# Patient Record
Sex: MALE | Race: WHITE | URBAN - METROPOLITAN AREA
[De-identification: names, ages, dates, MRNs, and addresses within clinical notes are randomized per-mention and may not be internally consistent; named-entity substitution may affect disease eponyms.]

---

## 2023-03-28 PROBLEM — Z00.00 ENCOUNTER FOR PREVENTIVE HEALTH EXAMINATION: Status: ACTIVE | Noted: 2023-03-28

## 2023-08-01 ENCOUNTER — OUTPATIENT (OUTPATIENT)
Dept: INPATIENT UNIT | Facility: HOSPITAL | Age: 83
LOS: 1 days | Discharge: HOME | End: 2023-08-01
Payer: COMMERCIAL

## 2023-08-01 ENCOUNTER — TRANSCRIPTION ENCOUNTER (OUTPATIENT)
Age: 83
End: 2023-08-01

## 2023-08-01 VITALS — DIASTOLIC BLOOD PRESSURE: 70 MMHG | RESPIRATION RATE: 17 BRPM | HEART RATE: 60 BPM | SYSTOLIC BLOOD PRESSURE: 134 MMHG

## 2023-08-01 VITALS
HEIGHT: 68 IN | HEART RATE: 67 BPM | WEIGHT: 160.06 LBS | RESPIRATION RATE: 17 BRPM | SYSTOLIC BLOOD PRESSURE: 152 MMHG | DIASTOLIC BLOOD PRESSURE: 69 MMHG | TEMPERATURE: 97 F

## 2023-08-01 DIAGNOSIS — Z90.49 ACQUIRED ABSENCE OF OTHER SPECIFIED PARTS OF DIGESTIVE TRACT: Chronic | ICD-10-CM

## 2023-08-01 DIAGNOSIS — H25.11 AGE-RELATED NUCLEAR CATARACT, RIGHT EYE: ICD-10-CM

## 2023-08-01 DIAGNOSIS — Z98.890 OTHER SPECIFIED POSTPROCEDURAL STATES: Chronic | ICD-10-CM

## 2023-08-01 PROCEDURE — V2632: CPT

## 2023-08-01 NOTE — ASU DISCHARGE PLAN (ADULT/PEDIATRIC) - NS MD DC FALL RISK RISK
For information on Fall & Injury Prevention, visit: [email or web address] OR  [email or web address] OR  https://www.cdc.gov/steadi/patient.html

## 2023-08-01 NOTE — ASU PATIENT PROFILE, ADULT - FALL HARM RISK - HARM RISK INTERVENTIONS

## 2023-08-01 NOTE — PACU DISCHARGE NOTE - COMMENTS
83 y o male S/P Right ECCE with IOL Implant, LSB/MAC without complications. VS /81 HR 55 RR 16 SaO2 99%. Pt tolerated procedure well.

## 2023-08-01 NOTE — ASU PATIENT PROFILE, ADULT - NSICDXPASTSURGICALHX_GEN_ALL_CORE_FT
PAST SURGICAL HISTORY:  H/O abdominal surgery     History of appendectomy     History of surgery Back surgery with pins     PAST SURGICAL HISTORY:  H/O abdominal surgery     History of appendectomy     History of surgery Back surgery with pins    S/P cholecystectomy

## 2023-08-03 DIAGNOSIS — Z88.2 ALLERGY STATUS TO SULFONAMIDES: ICD-10-CM

## 2023-08-03 DIAGNOSIS — H25.89 OTHER AGE-RELATED CATARACT: ICD-10-CM

## 2023-08-09 PROBLEM — I10 ESSENTIAL (PRIMARY) HYPERTENSION: Chronic | Status: ACTIVE | Noted: 2023-08-01

## 2023-08-22 ENCOUNTER — OUTPATIENT (OUTPATIENT)
Dept: OUTPATIENT SERVICES | Facility: HOSPITAL | Age: 83
LOS: 1 days | Discharge: HOME | End: 2023-08-22
Payer: MEDICARE

## 2023-08-22 ENCOUNTER — TRANSCRIPTION ENCOUNTER (OUTPATIENT)
Age: 83
End: 2023-08-22

## 2023-08-22 VITALS
DIASTOLIC BLOOD PRESSURE: 77 MMHG | OXYGEN SATURATION: 94 % | SYSTOLIC BLOOD PRESSURE: 169 MMHG | TEMPERATURE: 96 F | RESPIRATION RATE: 18 BRPM | WEIGHT: 162.92 LBS | HEIGHT: 68 IN | HEART RATE: 56 BPM

## 2023-08-22 VITALS — RESPIRATION RATE: 18 BRPM | DIASTOLIC BLOOD PRESSURE: 67 MMHG | SYSTOLIC BLOOD PRESSURE: 143 MMHG | HEART RATE: 57 BPM

## 2023-08-22 DIAGNOSIS — Z98.41 CATARACT EXTRACTION STATUS, RIGHT EYE: Chronic | ICD-10-CM

## 2023-08-22 DIAGNOSIS — Z98.890 OTHER SPECIFIED POSTPROCEDURAL STATES: Chronic | ICD-10-CM

## 2023-08-22 DIAGNOSIS — Z90.49 ACQUIRED ABSENCE OF OTHER SPECIFIED PARTS OF DIGESTIVE TRACT: Chronic | ICD-10-CM

## 2023-08-22 DIAGNOSIS — H25.12 AGE-RELATED NUCLEAR CATARACT, LEFT EYE: ICD-10-CM

## 2023-08-22 PROCEDURE — V2632: CPT

## 2023-08-22 RX ORDER — FLUTICASONE PROPIONATE AND SALMETEROL 50; 250 UG/1; UG/1
1 POWDER ORAL; RESPIRATORY (INHALATION)
Refills: 0 | DISCHARGE

## 2023-08-22 RX ORDER — ATENOLOL 25 MG/1
1 TABLET ORAL
Refills: 0 | DISCHARGE

## 2023-08-22 RX ORDER — MONTELUKAST 4 MG/1
1 TABLET, CHEWABLE ORAL
Refills: 0 | DISCHARGE

## 2023-08-22 NOTE — CHART NOTE - NSCHARTNOTEFT_GEN_A_CORE
PACU ANESTHESIA ADMISSION NOTE      Procedure:   Post op diagnosis:      ____  Intubated  TV:______       Rate: ______      FiO2: ______    _x___  Patent Airway    __x__  Full return of protective reflexes    __x__  Full recovery from anesthesia / back to baseline     Vitals:   T:98           R:  16                BP:  147/78                Sat: 99                  P: 56      Mental Status:  _x___ Awake  x _____ Alert   _____ Drowsy   _____ Sedated    Nausea/Vomiting:  ____ NO  ______Yes,   See Post - Op Orders          Pain Scale (0-10):  _____    Treatment: ____ None    ____ See Post - Op/PCA Orders    Post - Operative Fluids:   ____ Oral   ____ See Post - Op Orders    Plan: Discharge:x   ____Home       _____Floor     _____Critical Care    _____  Other:_________________    Comments:

## 2023-08-22 NOTE — ASU PATIENT PROFILE, ADULT - NSICDXPASTSURGICALHX_GEN_ALL_CORE_FT
PAST SURGICAL HISTORY:  H/O abdominal surgery     History of appendectomy     History of surgery Back surgery with pins    S/P cholecystectomy     Status post cataract extraction and insertion of intraocular lens, right

## 2023-08-24 DIAGNOSIS — Z88.2 ALLERGY STATUS TO SULFONAMIDES: ICD-10-CM

## 2023-08-24 DIAGNOSIS — H25.89 OTHER AGE-RELATED CATARACT: ICD-10-CM

## 2024-08-23 ENCOUNTER — APPOINTMENT (OUTPATIENT)
Dept: UROLOGY | Facility: CLINIC | Age: 84
End: 2024-08-23

## 2024-08-23 VITALS
DIASTOLIC BLOOD PRESSURE: 82 MMHG | HEIGHT: 68 IN | OXYGEN SATURATION: 98 % | HEART RATE: 56 BPM | SYSTOLIC BLOOD PRESSURE: 140 MMHG | BODY MASS INDEX: 21.67 KG/M2 | RESPIRATION RATE: 18 BRPM | WEIGHT: 143 LBS

## 2024-08-23 DIAGNOSIS — Z86.79 PERSONAL HISTORY OF OTHER DISEASES OF THE CIRCULATORY SYSTEM: ICD-10-CM

## 2024-08-23 DIAGNOSIS — R35.0 FREQUENCY OF MICTURITION: ICD-10-CM

## 2024-08-23 DIAGNOSIS — Z87.39 PERSONAL HISTORY OF OTHER DISEASES OF THE MUSCULOSKELETAL SYSTEM AND CONNECTIVE TISSUE: ICD-10-CM

## 2024-08-23 DIAGNOSIS — R97.20 ELEVATED PROSTATE, SPECIFIC ANTIGEN [PSA]: ICD-10-CM

## 2024-08-23 DIAGNOSIS — R35.1 NOCTURIA: ICD-10-CM

## 2024-08-23 PROCEDURE — G2211 COMPLEX E/M VISIT ADD ON: CPT

## 2024-08-23 PROCEDURE — 99204 OFFICE O/P NEW MOD 45 MIN: CPT

## 2024-08-23 RX ORDER — FLUTICASONE PROPIONATE AND SALMETEROL 50; 100 UG/1; UG/1
100-50 POWDER RESPIRATORY (INHALATION)
Refills: 0 | Status: ACTIVE | COMMUNITY

## 2024-08-23 RX ORDER — ATENOLOL AND CHLORTHALIDONE 50; 25 MG/1; MG/1
50-25 TABLET ORAL
Refills: 0 | Status: ACTIVE | COMMUNITY

## 2024-08-23 RX ORDER — ALBUTEROL SULFATE 2.5 MG/3ML
(2.5 MG/3ML) SOLUTION RESPIRATORY (INHALATION)
Refills: 0 | Status: ACTIVE | COMMUNITY

## 2024-08-23 RX ORDER — MONTELUKAST 10 MG/1
10 TABLET, FILM COATED ORAL
Refills: 0 | Status: ACTIVE | COMMUNITY

## 2024-08-23 RX ORDER — TAMSULOSIN HYDROCHLORIDE 0.4 MG/1
0.4 CAPSULE ORAL DAILY
Qty: 90 | Refills: 3 | Status: ACTIVE | COMMUNITY
Start: 2024-08-23 | End: 1900-01-01

## 2024-08-27 NOTE — ASU PATIENT PROFILE, ADULT - PROVIDER NOTIFICATION
[employer] Alert Outreach Telephone Call Attempt     Patient is being outreached by the [facility] Program for a clinical alert from the [employer] monitoring program.     Call Attempt Date: 8/27/2024    Call Attempt: Second Attempt    Unable to leave a message; mailbox is full.  
Declines

## 2024-08-29 LAB
BILIRUB UR QL STRIP: NORMAL
COLLECTION METHOD: NORMAL
GLUCOSE UR-MCNC: NORMAL
HCG UR QL: 0.2 EU/DL
HGB UR QL STRIP.AUTO: NORMAL
KETONES UR-MCNC: NORMAL
LEUKOCYTE ESTERASE UR QL STRIP: NORMAL
NITRITE UR QL STRIP: NORMAL
PH UR STRIP: 6
PROT UR STRIP-MCNC: NORMAL
SP GR UR STRIP: 1.02

## 2024-08-29 NOTE — HISTORY OF PRESENT ILLNESS
[FreeTextEntry1] : LAURENCE GUERRERO is a 84 year year old male who presents for consultation for elevated PSA  He has long history of chronic urinary frequency and nocturia.  In past was on terazosin 5 mg without improvement in symptoms.  He discontinued medication.  He currently has nocturia x 3 and would like to try different medication to see if he can improve this. Denies gross hematuria, dysuria or associated symptoms.   His PSA July 2024 is 8.72 ng/mL.  No prior PSA available to review.  Denies  PMH including previous kidney stones, recurrent UTIs.  Family History: No  malignancies Social History: , retired [employer], worked in construction.  Consultation requested by Dr. Culver

## 2024-09-12 ENCOUNTER — RESULT REVIEW (OUTPATIENT)
Age: 84
End: 2024-09-12

## 2024-09-12 ENCOUNTER — OUTPATIENT (OUTPATIENT)
Dept: OUTPATIENT SERVICES | Facility: HOSPITAL | Age: 84
LOS: 1 days | End: 2024-09-12
Payer: MEDICARE

## 2024-09-12 DIAGNOSIS — Z98.890 OTHER SPECIFIED POSTPROCEDURAL STATES: Chronic | ICD-10-CM

## 2024-09-12 DIAGNOSIS — Z90.49 ACQUIRED ABSENCE OF OTHER SPECIFIED PARTS OF DIGESTIVE TRACT: Chronic | ICD-10-CM

## 2024-09-12 DIAGNOSIS — R97.20 ELEVATED PROSTATE SPECIFIC ANTIGEN [PSA]: ICD-10-CM

## 2024-09-12 DIAGNOSIS — Z00.8 ENCOUNTER FOR OTHER GENERAL EXAMINATION: ICD-10-CM

## 2024-09-12 DIAGNOSIS — Z98.41 CATARACT EXTRACTION STATUS, RIGHT EYE: Chronic | ICD-10-CM

## 2024-09-12 PROCEDURE — A9579: CPT

## 2024-09-12 PROCEDURE — 72197 MRI PELVIS W/O & W/DYE: CPT

## 2024-09-12 PROCEDURE — 72197 MRI PELVIS W/O & W/DYE: CPT | Mod: 26,MH

## 2024-09-13 DIAGNOSIS — R97.20 ELEVATED PROSTATE SPECIFIC ANTIGEN [PSA]: ICD-10-CM

## 2024-09-27 ENCOUNTER — APPOINTMENT (OUTPATIENT)
Dept: UROLOGY | Facility: CLINIC | Age: 84
End: 2024-09-27
Payer: MEDICARE

## 2024-09-27 DIAGNOSIS — R97.20 ELEVATED PROSTATE, SPECIFIC ANTIGEN [PSA]: ICD-10-CM

## 2024-09-27 DIAGNOSIS — R35.1 NOCTURIA: ICD-10-CM

## 2024-09-27 DIAGNOSIS — R35.0 FREQUENCY OF MICTURITION: ICD-10-CM

## 2024-09-27 PROCEDURE — 51798 US URINE CAPACITY MEASURE: CPT

## 2024-09-27 PROCEDURE — 99214 OFFICE O/P EST MOD 30 MIN: CPT

## 2024-09-27 PROCEDURE — G2211 COMPLEX E/M VISIT ADD ON: CPT

## 2024-09-29 NOTE — ASSESSMENT
[FreeTextEntry1] :  84 year old male patient with elevated PSA with PIRADS 4 lesions x2 about 1.2 and 1.3 cm in size.  We discussed these issues at length. We discussed his nocturia and options available. He is going to do a voiding diary and try to elevate his feet in the evening and see if that helps. We discussed fluid mobilization. We discussed elevated PSA and options of following as we have no other old PSAs versus proceeding with fusion biopsies. Risks and benefits were discussed. He would like to repeat PSA in 3 months and have further discussion. If PSA is stable, we will continue to follow and repeat a PSA in 3 months and see him after that. If he has any significant increase, then we will discuss whether we should proceed with biopsies. We went over the fact that many patients over the age of 80 will have prostate cancer that will not cause them harm. However, he has some bothersome symptoms and whether it is related to prostate cancer is not clear at this point.  All of the patient's questions were otherwise answered.Total time=30 min.   The submitted E/M billing level for this visit reflects the total time spent on the day of the visit including face-to-face time spent with the patient, non-face-to-face review of medical records and relevant information, documentation, and asynchronous communication with the patient after a visit via phone, email, or patients EHR portal after the visit. The medical records reviewed are either scanned into the chart or reviewed with the patient using a patients electronic medical records portal for patients with records not available to [facility] via electronic transmission platforms from other institutions and labs. Time spent counseling and performing coordination of care was also included in determining the appropriate EM billing level.   I have reviewed and verified information regarding the chief complaint and history recorded by the ancillary staff and/or the patient. I have independently reviewed and interpreted tests performed by other physicians and facilities as necessary.   I have discussed with the patient differential diagnosis, reason for auxiliary tests if ordered, risks, benefits, alternatives, and complications of each form of therapy were discussed.  I personally performed the services described in the documentation, reviewed the documentation recorded by the scribe in my presence, and it accurately and completely records my words and actions.

## 2024-09-29 NOTE — ASSESSMENT
[FreeTextEntry1] :  84 year old male patient with elevated PSA with PIRADS 4 lesions x2 about 1.2 and 1.3 cm in size.  We discussed these issues at length. We discussed his nocturia and options available. He is going to do a voiding diary and try to elevate his feet in the evening and see if that helps. We discussed fluid mobilization. We discussed elevated PSA and options of following as we have no other old PSAs versus proceeding with fusion biopsies. Risks and benefits were discussed. He would like to repeat PSA in 3 months and have further discussion. If PSA is stable, we will continue to follow and repeat a PSA in 3 months and see him after that. If he has any significant increase, then we will discuss whether we should proceed with biopsies. We went over the fact that many patients over the age of 80 will have prostate cancer that will not cause them harm. However, he has some bothersome symptoms and whether it is related to prostate cancer is not clear at this point.  All of the patient's questions were otherwise answered.Total time=30 min.   The submitted E/M billing level for this visit reflects the total time spent on the day of the visit including face-to-face time spent with the patient, non-face-to-face review of medical records and relevant information, documentation, and asynchronous communication with the patient after a visit via phone, email, or patients EHR portal after the visit. The medical records reviewed are either scanned into the chart or reviewed with the patient using a patients electronic medical records portal for patients with records not available to [facility] via electronic transmission platforms from other institutions and labs. Time spent counseling and performing coordination of care was also included in determining the appropriate EM billing level.   I have reviewed and verified information regarding the chief complaint and history recorded by the ancillary staff and/or the patient. I have independently reviewed and interpreted tests performed by other physicians and facilities as necessary.   I have discussed with the patient differential diagnosis, reason for auxiliary tests if ordered, risks, benefits, alternatives, and complications of each form of therapy were discussed.  I personally performed the services described in the documentation, reviewed the documentation recorded by the scribe in my presence, and it accurately and completely records my words and actions.

## 2024-09-29 NOTE — ADDENDUM
[FreeTextEntry1] :  ROSEY GARCIA, am scribing for and in the presence of  in the following sections: HISTORY OF PRESENT ILLNESS, PAST MEDICAL/FAMILY/SOCIAL HISTORY, REVIEW OF SYSTEMS, VITAL SIGNS, PHYSICAL EXAM, ASSESSMENT/PLAN on 09/27/2024.

## 2024-09-29 NOTE — HISTORY OF PRESENT ILLNESS
[FreeTextEntry1] :  84 year old male patient seen in follow-up with history of elevated PSA who underwent an MRI, which he is here to review. He also came in today for a flow, however it is not valid as he only voided 98 mL, PVR 13 mL. MRI results which showed 2 PIRADS 4 lesions, PSA was 8.7 with prostate of 50 cc. He has no older PSAs - he has gone through previous lab work and cannot find any prior PSAs. He is primarily bothered by nocturia and we discussed in some detail.    Uroflow Results: Voiding time: 67.5 s Time to peak flow: 2.1 s Peak flow rate: 6.1 mL/s Average flow rate: 2.4 mL/s Voided volume: 98 mL PVR: 13 mL  Prostate MRI from 9/12/24: 1.  Two 1.4 cm peripheral zone prostate gland lesions. PIRADS 4 - High (clinically significant cancer is likely to be present) 2.  No lymphadenopathy. 3.  Indeterminate 1.7 x 1.3 cm rim-enhancing structure in the right gluteal region, nonaggressive in appearance. Findings may reflect a small collection versus thrombosed varix. Other etiologies are not excluded. If clinically warranted, further evaluation can be obtained with a dedicated soft tissue MRI. Prostate volume: 50 mL PSA Density: 0.17 ng/mL

## 2025-04-04 ENCOUNTER — APPOINTMENT (OUTPATIENT)
Dept: UROLOGY | Facility: CLINIC | Age: 85
End: 2025-04-04